# Patient Record
Sex: FEMALE | Race: WHITE | Employment: UNEMPLOYED | URBAN - METROPOLITAN AREA
[De-identification: names, ages, dates, MRNs, and addresses within clinical notes are randomized per-mention and may not be internally consistent; named-entity substitution may affect disease eponyms.]

---

## 2017-12-09 ENCOUNTER — APPOINTMENT (OUTPATIENT)
Dept: CT IMAGING | Age: 77
End: 2017-12-09
Payer: COMMERCIAL

## 2017-12-09 ENCOUNTER — HOSPITAL ENCOUNTER (EMERGENCY)
Age: 77
Discharge: HOME | End: 2017-12-09
Attending: SPECIALIST
Payer: COMMERCIAL

## 2017-12-09 VITALS
DIASTOLIC BLOOD PRESSURE: 68 MMHG | OXYGEN SATURATION: 96 % | TEMPERATURE: 98.1 F | SYSTOLIC BLOOD PRESSURE: 132 MMHG | BODY MASS INDEX: 37.76 KG/M2 | HEART RATE: 73 BPM | RESPIRATION RATE: 18 BRPM | HEIGHT: 61 IN | WEIGHT: 200 LBS

## 2017-12-09 DIAGNOSIS — S09.90XA HEAD INJURY, CLOSED, WITHOUT LOC, INITIAL ENCOUNTER: Primary | ICD-10-CM

## 2017-12-09 DIAGNOSIS — S01.01XA LACERATION OF SCALP, INITIAL ENCOUNTER: ICD-10-CM

## 2017-12-09 PROCEDURE — 99284 EMERGENCY DEPT VISIT MOD MDM: CPT

## 2017-12-09 PROCEDURE — 12001 RPR S/N/AX/GEN/TRNK 2.5CM/<: CPT

## 2017-12-09 PROCEDURE — 2500000003 HC RX 250 WO HCPCS: Performed by: PHYSICIAN ASSISTANT

## 2017-12-09 PROCEDURE — 6360000002 HC RX W HCPCS: Performed by: PHYSICIAN ASSISTANT

## 2017-12-09 PROCEDURE — 70450 CT HEAD/BRAIN W/O DYE: CPT

## 2017-12-09 PROCEDURE — 90471 IMMUNIZATION ADMIN: CPT | Performed by: PHYSICIAN ASSISTANT

## 2017-12-09 PROCEDURE — 90715 TDAP VACCINE 7 YRS/> IM: CPT | Performed by: PHYSICIAN ASSISTANT

## 2017-12-09 RX ORDER — LIDOCAINE HYDROCHLORIDE AND EPINEPHRINE 10; 10 MG/ML; UG/ML
20 INJECTION, SOLUTION INFILTRATION; PERINEURAL ONCE
Status: COMPLETED | OUTPATIENT
Start: 2017-12-09 | End: 2017-12-09

## 2017-12-09 RX ADMIN — TETANUS TOXOID, REDUCED DIPHTHERIA TOXOID AND ACELLULAR PERTUSSIS VACCINE, ADSORBED 0.5 ML: 5; 2.5; 8; 8; 2.5 SUSPENSION INTRAMUSCULAR at 15:49

## 2017-12-09 RX ADMIN — LIDOCAINE HYDROCHLORIDE,EPINEPHRINE BITARTRATE 20 ML: 10; .01 INJECTION, SOLUTION INFILTRATION; PERINEURAL at 16:20

## 2017-12-09 ASSESSMENT — ENCOUNTER SYMPTOMS
EYE REDNESS: 0
ABDOMINAL PAIN: 0
SHORTNESS OF BREATH: 0
VOMITING: 0
BACK PAIN: 0
COUGH: 0
SORE THROAT: 0
ROS SKIN COMMENTS: SCALP LACERATION.
NAUSEA: 0
EYE DISCHARGE: 0

## 2017-12-09 ASSESSMENT — PAIN SCALES - GENERAL
PAINLEVEL_OUTOF10: 0
PAINLEVEL_OUTOF10: 5

## 2017-12-09 ASSESSMENT — PAIN DESCRIPTION - PAIN TYPE: TYPE: ACUTE PAIN

## 2017-12-09 ASSESSMENT — PAIN DESCRIPTION - DESCRIPTORS: DESCRIPTORS: SORE

## 2017-12-09 ASSESSMENT — PAIN DESCRIPTION - LOCATION: LOCATION: HEAD

## 2017-12-09 ASSESSMENT — PAIN DESCRIPTION - FREQUENCY: FREQUENCY: CONTINUOUS

## 2017-12-09 NOTE — ED PROVIDER NOTES
[facility] ED  [address] [place] [address]  Phone: [phone]  Fax: [phone]      Pt Name: Simin Chapa  MRN: [id number]  [place] 1940  Date of evaluation: 12/9/2017      CHIEF COMPLAINT       Chief Complaint   Patient presents with    Fall       HISTORY OF PRESENT ILLNESS   (Location, Quality, Severity, Duration, Timing, Context, Modifying Factors, Associated Signs and Symptoms)     Simin Chapa is a 68 y.o. female who presents to the ER with her  for evaluation following a fall. Patient states that she fell at home approximately one hour ago. She tripped over a plastic bag that was on the floor. She states that she was carrying something and fell forward hitting her head against the handle of a wooden cabinet. Patient denies loss of consciousness. She did sustain a laceration to the frontal scalp. Patient has no point of headache or dizziness. She has no complaints of neck pain or back pain. Patient denies chest pain and difficulty breathing. She has no pain in the upper or lower extremities. Patient is on Plavix and aspirin 81 mg daily. She rates her current headache pain at 5/10. Patient's last tetanus was greater than 10 years ago. Nursing Notes were reviewed. REVIEW OF SYSTEMS     (2-9 systems for level 4, 10 or more for level 5)    Review of Systems   Constitutional: Negative for chills and fever. HENT: Negative for congestion, ear pain and sore throat. Eyes: Negative for discharge and redness. Respiratory: Negative for cough and shortness of breath. Cardiovascular: Negative for chest pain. Gastrointestinal: Negative for abdominal pain, nausea and vomiting. Genitourinary: Negative for dysuria and frequency. Musculoskeletal: Negative for back pain and neck pain. Skin:        Scalp laceration. All other systems reviewed and are negative.     PAST MEDICAL HISTORY    has a past medical history of CAD (coronary and breath sounds normal. No respiratory distress. She exhibits no tenderness. Abdominal: Soft. Bowel sounds are normal. There is no tenderness. There is no rebound and no guarding. Musculoskeletal:   No thoracic or lumbar midline tenderness. No bony tenderness or deformities noted in the upper or lower extremities. Neurological: She is alert and oriented to person, place, and time. She has normal strength. No cranial nerve deficit or sensory deficit. Coordination normal.   Skin: Skin is warm and dry. 1.3 cm vertical laceration over the mid-forehead near the hairline; no active bleeding. Psychiatric: She has a normal mood and affect. Her behavior is normal.   Vitals reviewed. DIAGNOSTIC RESULTS     RADIOLOGY:   Radiology images were visualized by myself. The Radiologist interpretations were reviewed and are as follows:     CT HEAD WO CONTRAST (Final result)   Result time 12/09/17 16:05:16   Final result by Iliana Tyler MD (12/09/17 16:05:16)                Impression:    No acute intracranial abnormality. Narrative:    EXAMINATION:  CT OF THE HEAD WITHOUT CONTRAST  12/9/2017 4:00 pm    TECHNIQUE:  CT of the head was performed without the administration of intravenous  contrast. Dose modulation, iterative reconstruction, and/or weight based  adjustment of the mA/kV was utilized to reduce the radiation dose to as low  as reasonably achievable. COMPARISON:  None. HISTORY:  ORDERING SYSTEM PROVIDED HISTORY: fall; head inury; no LOC  TECHNOLOGIST PROVIDED HISTORY:  Has a \"code stroke\" or \"stroke alert\" been called? ->No  Ordering Physician Provided Reason for Exam: Frontal head injury after a fall  into a cabinet today. Laceration noted. No LOC  Acuity: Acute  Type of Exam: Initial  Mechanism of Injury: Fall  Relevant Medical/Surgical History: no LOC, denies brain surgeries. Could not  remove Earring.     FINDINGS:  BRAIN/VENTRICLES: There is no acute intracranial hemorrhage, mass effect with epinephrine. The wound edges were reapproximated using 2 staples. The patient tolerated the procedure well. No complications. Wound care instructions were discussed with the patient. Re-evaluation Notes  4:38 PM.  Results of the head CT was discussed with the patient and her  by myself. Head CT showing no acute intracranial abnormality. Patient will be discharged to home with head injury instructions. Staple removal in 7-10 days. FINAL IMPRESSION      1. Head injury, closed, without LOC, initial encounter    2. Laceration of scalp, initial encounter        DISPOSITION/PLAN   DISPOSITION Decision to Discharge- home    Condition on Disposition  Stable    PATIENT REFERRED TO:  No follow-up provider specified.     DISCHARGE MEDICATIONS:  New Prescriptions    No medications on file     (Please note that portions of this note were completed with a voice recognition program.  Efforts were made to edit the dictations but occasionally words are mis-transcribed.)    Neha Power PA-C  12/12/17 111

## 2017-12-09 NOTE — ED NOTES
PA at bedside discussing plans for discharge.      [address] [facility], RN  12/09/17 0224

## 2017-12-10 NOTE — ED PROVIDER NOTES
Emergency Department     Faculty Attestation    I performed a history and physical examination of the patient and discussed management with the mid level provider. I reviewed the mid level provider's note and agree with the documented findings and plan of care. Any areas of disagreement are noted on the chart. I was personally present for the key portions of any procedures. I have documented in the chart those procedures where I was not present during the key portions. I have reviewed the emergency nurses triage note. I agree with the chief complaint, past medical history, past surgical history, allergies, medications, social and family history as documented unless otherwise noted below. Documentation of the HPI, Physical Exam and Medical Decision Making performed by medical students or scribes is based on my personal performance of the HPI, PE and MDM. For Physician Assistant/ Nurse Practitioner cases/documentation I have personally evaluated this patient and have completed at least one if not all key elements of the E/M (history, physical exam, and MDM). Additional findings are as noted. Primary Care Physician:  Felipa Huff       Chief Complaint   Patient presents with    Fall       RECENT VITALS:   Temp: 98.1 °F (36.7 °C),  Pulse: 73, Resp: 18, BP: 132/68    LABS:  Labs Reviewed - No data to display      PERTINENT ATTENDING PHYSICIAN COMMENTS:    68-year-old female patient presents to the emergency department brought in by her  after patient apparently tripped at home and fell forward hitting the mid forehead on the wooden cabinet. No history of loss of consciousness. Patient denies any headache except pain in the mid forehead at the site of laceration. She denies any neck pain, tingling, numbness or weakness in any of the extremities. She is on baby aspirin as well as Plavix. Patient is afebrile and vital signs are stable.   She has a vertical laceration approximately 1.5 cm in size in the mid forehead near the hairline. There is no evidence of foreign body, tendon or nerve involvement. No evidence of hemotympanum, anton sign or raccoons eyes. Cervical spine is nontender. Lungs are clear to auscultation. Rhythm is regular without murmurs. Abdomen is soft and nontender with active bowel sounds. Detailed neurological examination is normal.  CAT scan of the brain was obtained which is unremarkable. Patient was given Boostrix 0.5 mg intramuscularly. Her laceration was repaired by mid-level provider and closely supervised by myself. Patient tolerated the procedure well.         Janina Del Cid MD  12/10/17 0354